# Patient Record
Sex: MALE | Race: WHITE
[De-identification: names, ages, dates, MRNs, and addresses within clinical notes are randomized per-mention and may not be internally consistent; named-entity substitution may affect disease eponyms.]

---

## 2019-01-17 ENCOUNTER — HOSPITAL ENCOUNTER (EMERGENCY)
Dept: HOSPITAL 25 - UCCORT | Age: 39
Discharge: HOME | End: 2019-01-17
Payer: COMMERCIAL

## 2019-01-17 VITALS — DIASTOLIC BLOOD PRESSURE: 73 MMHG | SYSTOLIC BLOOD PRESSURE: 131 MMHG

## 2019-01-17 DIAGNOSIS — F17.210: ICD-10-CM

## 2019-01-17 DIAGNOSIS — R09.1: Primary | ICD-10-CM

## 2019-01-17 PROCEDURE — G0463 HOSPITAL OUTPT CLINIC VISIT: HCPCS

## 2019-01-17 PROCEDURE — 71046 X-RAY EXAM CHEST 2 VIEWS: CPT

## 2019-01-17 PROCEDURE — 93005 ELECTROCARDIOGRAM TRACING: CPT

## 2019-01-17 PROCEDURE — 99212 OFFICE O/P EST SF 10 MIN: CPT

## 2019-01-17 NOTE — UC
Cardiac HPI





- HPI Summary


HPI Summary: 





mid chest pain x 1 day 


pain is 2 out of 10 , no radiation ,


worse with breathing and coughing , better with rest,


+ sob , fatigue , cannot take a deep breath ,


no know injury , no cardiac hx 








- History of Current Complaint


Chief Complaint: UCChestPain


Stated Complaint: SHORTNESS OF BREATH CHEST PAIN


Time Seen by Provider: 01/17/19 09:55


Hx Obtained From: Patient


Onset/Duration: Gradual Onset, Lasting Days - 1, Still Present


Timing: Constant


Initial Severity: Moderate


Current Severity: Moderate


Pain Intensity: 2


Chest Pain Location: Mid Sternal


Character: Tightness


Aggravating Factor(s): Exertion, Movement, Deep Breaths


Alleviating Factor(s): Rest


Associated Signs & Symptoms: Positive: Chest Pain, SOB, Cough.  Negative: 

Vision Changes, Anxiety, Recent Stress, Headaches, Numbness, Tingling, Weakness

, Dizziness, Swelling, Fever, Diaphoresis, Nausea/Vomiting, Palpitations





- Allergy/Home Medications


Allergies/Adverse Reactions: 


 Allergies











Allergy/AdvReac Type Severity Reaction Status Date / Time


 


No Known Allergies Allergy   Verified 01/17/19 10:04














PMH/Surg Hx/FS Hx/Imm Hx


Previously Healthy: Yes





- Surgical History


Surgical History: Yes


Surgery Procedure, Year, and Place: ANKLE SURGERY LEFT





- Family History


Known Family History: Positive: Hypertension





- Social History


Alcohol Use: Rare


Substance Use Type: Marijuana


Substance Use Comment - Amount & Last Used: occasional use- last smoked 1/16/19


Smoking Status (MU): Heavy Every Day Tobacco Smoker


Type: Cigarettes


Amount Used/How Often: 1 PPD


Have You Smoked in the Last Year: Yes


Household Exposure Type: Cigarettes





Review of Systems


All Other Systems Reviewed And Are Negative: Yes


Constitutional: Positive: Negative


Skin: Positive: Negative


Eyes: Positive: Negative


ENT: Positive: Negative


Respiratory: Positive: Shortness Of Breath, Cough


Cardiovascular: Positive: Chest Pain


Gastrointestinal: Positive: Negative


Is Patient Immunocompromised?: No





Physical Exam


Triage Information Reviewed: Yes


Appearance: Well-Nourished, Pain Distress


Vital Signs: 


 Initial Vital Signs











Temp  99.2 F   01/17/19 10:06


 


Pulse  50   01/17/19 10:06


 


Resp  18   01/17/19 10:06


 


BP  131/73   01/17/19 10:06


 


Pulse Ox  100   01/17/19 10:06











Vital Signs Reviewed: Yes


Eye Exam: Normal


Eyes: Positive: Conjunctiva Clear


ENT: Positive: Normal ENT inspection, Hearing grossly normal, Pharynx normal


Neck: Positive: Supple, Nontender, No Lymphadenopathy


Respiratory: Positive: Chest non-tender, Lungs clear, Normal breath sounds, No 

respiratory distress


Cardiovascular: Positive: RRR, No Murmur, Pulses Normal


Abdominal Exam: Normal


Abdomen Description: Positive: Nontender, Soft.  Negative: CVA Tenderness (R), 

CVA Tenderness (L), Distended, Guarding


Bowel Sounds: Positive: Present


Skin Exam: Normal





Diagnostics





- Laboratory


Diagnostic Studies Completed/Ordered: chest xray : IMPRESSION:  #. Elevated 

lung volumes may reflect obstructive lung disease or simply exuberant.  

inspiratory effort for examination.  #. No evidence for acute intrathoracic 

disease.





- Clinical Impression


Provider Diagnosis: 


 Pleurisy








Discharge





- Sign-Out/Discharge


Documenting (check all that apply): Patient Departure


All imaging exams completed and their final reports reviewed: Yes





- Discharge Plan


Condition: Stable


Disposition: HOME


Prescriptions: 


Naproxen [Naproxen 500 mg tab] 500 mg PO BID #20 tablet.dr


Patient Education Materials:  Pleurisy (ED)


Referrals: 


Julia Dumont NP [Primary Care Provider] - 





- Billing Disposition and Condition


Condition: STABLE


Disposition: Home